# Patient Record
Sex: FEMALE | Race: BLACK OR AFRICAN AMERICAN | Employment: OTHER | URBAN - METROPOLITAN AREA
[De-identification: names, ages, dates, MRNs, and addresses within clinical notes are randomized per-mention and may not be internally consistent; named-entity substitution may affect disease eponyms.]

---

## 2018-06-04 ENCOUNTER — HOSPITAL ENCOUNTER (OUTPATIENT)
Dept: CT IMAGING | Age: 68
Discharge: HOME | End: 2018-06-04
Payer: SELF-PAY

## 2018-06-04 DIAGNOSIS — Z13.6 SCREENING FOR ISCHEMIC HEART DISEASE: ICD-10-CM

## 2018-06-04 PROCEDURE — 75571 CT HRT W/O DYE W/CA TEST: CPT

## 2018-06-11 ENCOUNTER — TELEPHONE (OUTPATIENT)
Dept: CARDIAC REHAB | Age: 68
End: 2018-06-11

## 2018-06-11 NOTE — TELEPHONE ENCOUNTER
Called patient to review CT scan results. Verified date of birth. Discussed results. Her calcium score is 3. This corresponds to a small amount of plaque noted in the coronary arteries. Her risk for a heart attack is  low. It is reccommended that she  follow up with her PCP to make sure that any and all risk factors are being optimally managed. Patient verbalized understanding of results. Will fax report to his/her PCP, David Bynum.